# Patient Record
Sex: MALE
[De-identification: names, ages, dates, MRNs, and addresses within clinical notes are randomized per-mention and may not be internally consistent; named-entity substitution may affect disease eponyms.]

---

## 2017-10-21 ENCOUNTER — CHARTING TRANS (OUTPATIENT)
Dept: OTHER | Age: 62
End: 2017-10-21

## 2018-11-23 ENCOUNTER — IMAGING SERVICES (OUTPATIENT)
Dept: OTHER | Age: 63
End: 2018-11-23

## 2020-06-23 ENCOUNTER — HOSPITAL ENCOUNTER (OUTPATIENT)
Age: 65
Discharge: HOME | End: 2020-06-23
Payer: MEDICARE

## 2020-06-23 DIAGNOSIS — I25.10: ICD-10-CM

## 2020-06-23 DIAGNOSIS — Z01.818: Primary | ICD-10-CM

## 2020-06-23 PROCEDURE — A9502 TC99M TETROFOSMIN: HCPCS

## 2020-06-23 PROCEDURE — 93017 CV STRESS TEST TRACING ONLY: CPT

## 2020-06-23 PROCEDURE — 78452 HT MUSCLE IMAGE SPECT MULT: CPT

## 2020-06-24 ENCOUNTER — HOSPITAL ENCOUNTER (OUTPATIENT)
Dept: HOSPITAL 102 - ANHCOVIDDT | Age: 65
Discharge: HOME | End: 2020-06-24
Payer: MEDICARE

## 2020-06-24 DIAGNOSIS — Z11.59: ICD-10-CM

## 2020-06-24 DIAGNOSIS — Z01.812: Primary | ICD-10-CM

## 2020-06-24 PROCEDURE — 87635 SARS-COV-2 COVID-19 AMP PRB: CPT

## 2020-06-24 PROCEDURE — U0003 INFECTIOUS AGENT DETECTION BY NUCLEIC ACID (DNA OR RNA); SEVERE ACUTE RESPIRATORY SYNDROME CORONAVIRUS 2 (SARS-COV-2) (CORONAVIRUS DISEASE [COVID-19]), AMPLIFIED PROBE TECHNIQUE, MAKING USE OF HIGH THROUGHPUT TECHNOLOGIES AS DESCRIBED BY CMS-2020-01-R: HCPCS

## 2020-06-24 PROCEDURE — C9803 HOPD COVID-19 SPEC COLLECT: HCPCS

## 2020-06-26 ENCOUNTER — HOSPITAL ENCOUNTER (OUTPATIENT)
Age: 65
Discharge: HOME | End: 2020-06-26
Payer: MEDICARE

## 2020-06-26 VITALS
HEART RATE: 48 BPM | SYSTOLIC BLOOD PRESSURE: 147 MMHG | RESPIRATION RATE: 14 BRPM | OXYGEN SATURATION: 98 % | DIASTOLIC BLOOD PRESSURE: 66 MMHG

## 2020-06-26 VITALS
HEART RATE: 49 BPM | RESPIRATION RATE: 14 BRPM | OXYGEN SATURATION: 95 % | DIASTOLIC BLOOD PRESSURE: 71 MMHG | SYSTOLIC BLOOD PRESSURE: 135 MMHG

## 2020-06-26 VITALS
RESPIRATION RATE: 18 BRPM | SYSTOLIC BLOOD PRESSURE: 144 MMHG | DIASTOLIC BLOOD PRESSURE: 73 MMHG | OXYGEN SATURATION: 99 % | HEART RATE: 54 BPM | TEMPERATURE: 97.52 F

## 2020-06-26 VITALS
SYSTOLIC BLOOD PRESSURE: 142 MMHG | DIASTOLIC BLOOD PRESSURE: 72 MMHG | OXYGEN SATURATION: 98 % | RESPIRATION RATE: 14 BRPM | HEART RATE: 47 BPM

## 2020-06-26 VITALS — DIASTOLIC BLOOD PRESSURE: 59 MMHG | RESPIRATION RATE: 14 BRPM | HEART RATE: 43 BPM | SYSTOLIC BLOOD PRESSURE: 133 MMHG

## 2020-06-26 VITALS
DIASTOLIC BLOOD PRESSURE: 61 MMHG | OXYGEN SATURATION: 95 % | SYSTOLIC BLOOD PRESSURE: 133 MMHG | HEART RATE: 49 BPM | RESPIRATION RATE: 14 BRPM

## 2020-06-26 VITALS
RESPIRATION RATE: 12 BRPM | TEMPERATURE: 97.2 F | SYSTOLIC BLOOD PRESSURE: 155 MMHG | OXYGEN SATURATION: 98 % | HEART RATE: 54 BPM | DIASTOLIC BLOOD PRESSURE: 71 MMHG

## 2020-06-26 VITALS — RESPIRATION RATE: 14 BRPM | SYSTOLIC BLOOD PRESSURE: 136 MMHG | DIASTOLIC BLOOD PRESSURE: 64 MMHG | HEART RATE: 47 BPM

## 2020-06-26 VITALS — HEART RATE: 45 BPM | RESPIRATION RATE: 14 BRPM | SYSTOLIC BLOOD PRESSURE: 135 MMHG | DIASTOLIC BLOOD PRESSURE: 61 MMHG

## 2020-06-26 VITALS
OXYGEN SATURATION: 95 % | HEART RATE: 42 BPM | DIASTOLIC BLOOD PRESSURE: 63 MMHG | RESPIRATION RATE: 14 BRPM | SYSTOLIC BLOOD PRESSURE: 139 MMHG

## 2020-06-26 VITALS
RESPIRATION RATE: 14 BRPM | HEART RATE: 42 BPM | DIASTOLIC BLOOD PRESSURE: 66 MMHG | OXYGEN SATURATION: 95 % | SYSTOLIC BLOOD PRESSURE: 131 MMHG

## 2020-06-26 VITALS — BODY MASS INDEX: 32.3 KG/M2

## 2020-06-26 DIAGNOSIS — Z79.899: ICD-10-CM

## 2020-06-26 DIAGNOSIS — S46.011A: ICD-10-CM

## 2020-06-26 DIAGNOSIS — E03.9: ICD-10-CM
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
## 2020-06-26 DIAGNOSIS — G89.18: ICD-10-CM

## 2020-06-26 DIAGNOSIS — E66.9: ICD-10-CM

## 2020-06-26 DIAGNOSIS — S46.211A: Primary | ICD-10-CM

## 2020-06-26 DIAGNOSIS — Z95.5: ICD-10-CM

## 2020-06-26 DIAGNOSIS — Z79.82: ICD-10-CM

## 2020-06-26 DIAGNOSIS — M24.111: ICD-10-CM

## 2020-06-26 DIAGNOSIS — E78.2: ICD-10-CM

## 2020-06-26 DIAGNOSIS — I25.10: ICD-10-CM

## 2020-06-26 DIAGNOSIS — I10: ICD-10-CM

## 2020-06-26 DIAGNOSIS — G47.33: ICD-10-CM

## 2020-06-26 DIAGNOSIS — M67.813: ICD-10-CM

## 2020-06-26 DIAGNOSIS — S43.431A: ICD-10-CM

## 2020-06-26 PROCEDURE — 64415 NJX AA&/STRD BRCH PLXS IMG: CPT

## 2020-06-26 PROCEDURE — 29827 SHO ARTHRS SRG RT8TR CUF RPR: CPT

## 2020-06-26 PROCEDURE — 24342 REPAIR OF RUPTURED TENDON: CPT

## 2020-06-26 PROCEDURE — A9270 NON-COVERED ITEM OR SERVICE: HCPCS

## 2022-08-22 ENCOUNTER — HOSPITAL ENCOUNTER (OUTPATIENT)
Age: 67
Discharge: HOME | End: 2022-08-22
Payer: MEDICARE

## 2022-08-22 DIAGNOSIS — R79.89: ICD-10-CM

## 2022-08-22 DIAGNOSIS — N17.8: Primary | ICD-10-CM

## 2022-08-22 PROCEDURE — 76775 US EXAM ABDO BACK WALL LIM: CPT

## 2023-06-13 ENCOUNTER — HOSPITAL ENCOUNTER (OUTPATIENT)
Dept: HOSPITAL 102 - ANHIMG | Age: 68
Discharge: HOME | End: 2023-06-13
Payer: MEDICARE

## 2023-06-13 DIAGNOSIS — R09.89: Primary | ICD-10-CM

## 2023-06-13 DIAGNOSIS — I65.23: ICD-10-CM

## 2023-06-13 PROCEDURE — 93880 EXTRACRANIAL BILAT STUDY: CPT

## 2023-08-24 ENCOUNTER — HOSPITAL ENCOUNTER (OUTPATIENT)
Age: 68
End: 2023-08-24
Payer: MEDICARE

## 2023-08-24 DIAGNOSIS — M17.11: ICD-10-CM

## 2023-08-24 DIAGNOSIS — M25.461: ICD-10-CM

## 2023-08-24 DIAGNOSIS — T14.90XA: ICD-10-CM

## 2023-08-24 DIAGNOSIS — M71.21: ICD-10-CM

## 2023-08-24 DIAGNOSIS — S83.231A: ICD-10-CM

## 2023-08-24 DIAGNOSIS — M22.41: ICD-10-CM

## 2023-08-24 DIAGNOSIS — M23.91: Primary | ICD-10-CM

## 2023-08-24 PROCEDURE — 73721 MRI JNT OF LWR EXTRE W/O DYE: CPT

## 2023-09-25 ENCOUNTER — HOSPITAL ENCOUNTER (OUTPATIENT)
Dept: HOSPITAL 102 - ANHSURGERY | Age: 68
Discharge: HOME | End: 2023-09-25
Payer: MEDICARE

## 2023-09-25 DIAGNOSIS — I12.9: Primary | ICD-10-CM

## 2023-09-25 DIAGNOSIS — N18.31: ICD-10-CM

## 2023-09-25 LAB
DELTA APTT PPP: 26.3 SECONDS (ref 22.3–36.8)
INR: 1
PARTIAL THROMBOPLASTIN TIME: 26.3 SECONDS (ref 22.3–36.8)
PROTHROMBIN TIME: 13.2 SECONDS (ref 11.1–14.7)

## 2023-09-25 PROCEDURE — 85730 THROMBOPLASTIN TIME PARTIAL: CPT

## 2023-09-25 PROCEDURE — 93005 ELECTROCARDIOGRAM TRACING: CPT

## 2023-09-25 PROCEDURE — 36415 COLL VENOUS BLD VENIPUNCTURE: CPT

## 2023-09-25 PROCEDURE — 85610 PROTHROMBIN TIME: CPT

## 2023-09-28 ENCOUNTER — HOSPITAL ENCOUNTER (OUTPATIENT)
Age: 68
Discharge: HOME | End: 2023-09-28
Payer: MEDICARE

## 2023-09-28 VITALS
TEMPERATURE: 97.7 F | RESPIRATION RATE: 16 BRPM | HEART RATE: 74 BPM | OXYGEN SATURATION: 99 % | SYSTOLIC BLOOD PRESSURE: 138 MMHG | DIASTOLIC BLOOD PRESSURE: 69 MMHG

## 2023-09-28 VITALS
HEART RATE: 55 BPM | DIASTOLIC BLOOD PRESSURE: 68 MMHG | SYSTOLIC BLOOD PRESSURE: 139 MMHG | TEMPERATURE: 97.52 F | OXYGEN SATURATION: 97 % | RESPIRATION RATE: 16 BRPM

## 2023-09-28 VITALS
SYSTOLIC BLOOD PRESSURE: 139 MMHG | DIASTOLIC BLOOD PRESSURE: 69 MMHG | OXYGEN SATURATION: 100 % | RESPIRATION RATE: 16 BRPM | HEART RATE: 62 BPM

## 2023-09-28 VITALS — DIASTOLIC BLOOD PRESSURE: 50 MMHG | HEART RATE: 56 BPM | SYSTOLIC BLOOD PRESSURE: 130 MMHG | RESPIRATION RATE: 18 BRPM

## 2023-09-28 VITALS
SYSTOLIC BLOOD PRESSURE: 140 MMHG | OXYGEN SATURATION: 96 % | DIASTOLIC BLOOD PRESSURE: 62 MMHG | RESPIRATION RATE: 16 BRPM | HEART RATE: 62 BPM

## 2023-09-28 VITALS — SYSTOLIC BLOOD PRESSURE: 139 MMHG | RESPIRATION RATE: 18 BRPM | DIASTOLIC BLOOD PRESSURE: 58 MMHG | HEART RATE: 57 BPM

## 2023-09-28 VITALS — DIASTOLIC BLOOD PRESSURE: 54 MMHG | RESPIRATION RATE: 16 BRPM | HEART RATE: 53 BPM | SYSTOLIC BLOOD PRESSURE: 144 MMHG

## 2023-09-28 VITALS — BODY MASS INDEX: 34.6 KG/M2

## 2023-09-28 DIAGNOSIS — E55.9: ICD-10-CM

## 2023-09-28 DIAGNOSIS — E66.9: ICD-10-CM

## 2023-09-28 DIAGNOSIS — Z87.891: ICD-10-CM

## 2023-09-28 DIAGNOSIS — Z95.5: ICD-10-CM

## 2023-09-28 DIAGNOSIS — I25.10: ICD-10-CM

## 2023-09-28 DIAGNOSIS — E78.2: ICD-10-CM

## 2023-09-28 DIAGNOSIS — M23.331: Primary | ICD-10-CM

## 2023-09-28 DIAGNOSIS — M22.41: ICD-10-CM

## 2023-09-28 DIAGNOSIS — Z79.82: ICD-10-CM

## 2023-09-28 DIAGNOSIS — G47.33: ICD-10-CM

## 2023-09-28 DIAGNOSIS — E03.9: ICD-10-CM

## 2023-09-28 PROCEDURE — 29881 ARTHRS KNE SRG MNISECTMY M/L: CPT

## 2023-09-28 PROCEDURE — A9270 NON-COVERED ITEM OR SERVICE: HCPCS

## 2024-09-15 ENCOUNTER — HOSPITAL ENCOUNTER (EMERGENCY)
Age: 69
Discharge: HOME | End: 2024-09-15
Payer: MEDICARE

## 2024-09-15 VITALS
TEMPERATURE: 97.8 F | DIASTOLIC BLOOD PRESSURE: 62 MMHG | RESPIRATION RATE: 18 BRPM | HEART RATE: 58 BPM | OXYGEN SATURATION: 98 % | SYSTOLIC BLOOD PRESSURE: 133 MMHG

## 2024-09-15 VITALS
SYSTOLIC BLOOD PRESSURE: 133 MMHG | HEART RATE: 58 BPM | DIASTOLIC BLOOD PRESSURE: 62 MMHG | TEMPERATURE: 97.88 F | OXYGEN SATURATION: 98 % | RESPIRATION RATE: 18 BRPM

## 2024-09-15 DIAGNOSIS — Z20.822: ICD-10-CM

## 2024-09-15 DIAGNOSIS — Z87.891: ICD-10-CM

## 2024-09-15 DIAGNOSIS — E03.9: ICD-10-CM

## 2024-09-15 DIAGNOSIS — I25.10: ICD-10-CM

## 2024-09-15 DIAGNOSIS — B34.9: Primary | ICD-10-CM

## 2024-09-15 DIAGNOSIS — E55.9: ICD-10-CM

## 2024-09-15 DIAGNOSIS — E78.2: ICD-10-CM

## 2024-09-15 DIAGNOSIS — Z95.5: ICD-10-CM

## 2024-09-15 PROCEDURE — 71046 X-RAY EXAM CHEST 2 VIEWS: CPT

## 2024-09-15 PROCEDURE — G0463 HOSPITAL OUTPT CLINIC VISIT: HCPCS

## 2024-09-15 PROCEDURE — 87635 SARS-COV-2 COVID-19 AMP PRB: CPT

## 2024-09-15 PROCEDURE — 99213 OFFICE O/P EST LOW 20 MIN: CPT

## 2024-09-15 PROCEDURE — 87804 INFLUENZA ASSAY W/OPTIC: CPT

## 2024-09-15 NOTE — ED.URI
HPI - URI/Sore Throat
General
Chief Complaint: Upper Respiratory Infection
Stated Complaint: cold symthoms
Source: patient
Mode of arrival: ambulatory
Limitations: no limitations
History of Present Illness
HPI Narrative: 
69-year-old male presents to [facility] with complaints of 2 day history of nonproductive cough, sinus pressure, nasal congestion, runny nose and body aches.  Patient has been taking over-the-counter cold medications and multivitamins with little 
relief.  Patient denies sick contacts.  Patient reports that he did recently travel to [place] last week.  Patient is a nonsmoker.
MD elicited complaint: cough, rhinorrhea and nasal congestion
Onset (ago): day(s) (2)
Severity: mild
Able to tolerate fluids by mouth: Yes
Treatments prior to arrival:  cold medicine 
Related Data
Home Medications

 Medication  Instructions  Recorded  Confirmed
aspirin 81 mg tablet,delayed 81 mg PO DAILY 04/27/20 09/15/24
release (Adult Low Dose Aspirin)   
multivitamin 1 tablet PO DAILY 04/27/20 09/15/24
nebivolol 2.5 mg tablet (Bystolic) 2.5 mg PO QAM 09/21/23 09/15/24
nitroglycerin 0.4 mg sublingual 0.4 mg sublingual Q5-15M PRN Chest 09/21/23 09/15/24
tablet Pain  
sildenafil 25 mg tablet (Viagra) 25 mg PO DAILY PRN Erectile 09/21/23 09/15/24
 Dysfunction  
magnesium 200 mg tablet 200 mg PO DAILY 08/06/24 09/15/24


Allergies

Allergy/AdvReac Type Severity Reaction Status Date / Time
No Known Allergies Allergy   Verified 09/15/24 12:56



Review of Systems
Constitutional:   Constitutional: Denies chills, Denies fatigue, Denies fever(s) and Denies weakness  Comments: 

Body aches
 
ENT:   Denies dizziness, Denies epistaxis, Reports nasal congestion and Denies sore throat
Cardiovascular:   Cardiovascular: Denies chest pain
Respiratory:   Respiratory: Denies chest congestion, Reports cough, Denies dyspnea and Denies wheezing
Gastrointestinal:   Gastrointestinal: Denies diarrhea, Denies nausea and Denies vomiting
Integumentary/Breasts:   Skin/Breast: Denies erythema and Denies rash
Neurologic:   Denies dizziness, Denies syncope and Denies headache(s)

Formerly [facility]
Past Medical History
Medical History (Reviewed 09/15/24 @ 13:07 by Elvira Downey, APRN)

23-polyvalent pneumococcal polysaccharide vaccine contraindicated as received shingles vaccine within last 4 weeks
Abnormal blood chemistry
Arthritis of subtalar joint
CAD S/P percutaneous coronary angioplasty
Coronary artery disease involving native coronary artery of native heart
Eczema
Encounter for special screening examination for neoplasm of prostate
Hyperglycemia
Hypothyroidism, unspecified
Mixed hyperlipidemia
MADI (obstructive sleep apnea)
Partial tear of right subscapularis tendon
Rotator cuff arthropathy of right shoulder
Rupture of right long head biceps tendon
Screening PSA (prostate specific antigen)
Seasonal allergic rhinitis
Vitamin D deficiency, unspecified


Surgical History
Surgical History (Reviewed 09/15/24 @ 13:07 by ALEXI Hale)

History of ankle surgery (~2018)
History of bunionectomy (~1995)
History of heart artery stent (~2016)
History of meniscectomy of right knee (~09/28/23)
 medial
Status post labral repair of shoulder (~2010)


Family History
Family History (Reviewed 09/15/24 @ 13:07 by ALEXI Hale)
Grandparent
 Malignant neoplasm of prostate
Father
 Family history of malignant neoplasm of kidney
Unknown
 Heart disease
Other
 Family history of malignant neoplasm



Social History
Social History (Reviewed 09/15/24 @ 13:07 by ALEXI Hale)
Smoking packs per day:  1 
Smoking cigarettes per day:  20.0 
Years smoked:  20 
Smoking pack-years:  20.00 
Smoking status:  Former smoker 
Tobacco type:  cigarettes 
Second hand tobacco smoke exposure:  No 
Smoking end date:  07/01/90 
Alcohol intake:  current 
Drinks per week:  3 
Substance use:  never 
Substance use type:  does not use 
Do You Feel Safe in your Home?:  Yes 
Lack of Transportati

## 2024-09-15 NOTE — XR_ITS
EXAMINATION: XR chest 2V 
 
DATE: 09/15/2024 13:12 
 
INDICATION: Cough 
 
TECHNIQUE: PA and lateral views of the chest were obtained. 
 
COMPARISON: Chest radiograph and CT dated 10/23/2017 
 
FINDINGS:  
The lungs are clear with no focal airspace opacities, pulmonary edema, pleural effusion or pneumothor
ax. The cardiomediastinal silhouette is normal. Couple old healed posterior left third-sixth rib frac
tures. 
 
IMPRESSION: 
1. No acute cardiopulmonary disease. 
 
__________________________________________________ 
Reviewed, dictated and finalized at location A. 
Electronically signed by Dileep Leigh M.D. on 9/15/2024 13:20 CDT 
IMPRESSION:  
1. No acute cardiopulmonary disease.

## 2024-09-15 NOTE — ED_ITS
HPI - URI/Sore Throat    
General    
Chief Complaint: Upper Respiratory Infection    
Stated Complaint: cold symthoms    
Source: patient    
Mode of arrival: ambulatory    
Limitations: no limitations    
History of Present Illness    
HPI Narrative:     
69-year-old male presents to [facility] with complaints of 2 day history of   
nonproductive cough, sinus pressure, nasal congestion, runny nose and body   
aches.  Patient has been taking over-the-counter cold medications and   
multivitamins with little relief.  Patient denies sick contacts.  Patient   
reports that he did recently travel to [place] last week.  Patient is a   
nonsmoker.    
MD elicited complaint: cough, rhinorrhea and nasal congestion    
Onset (ago): day(s) (2)    
Severity: mild    
Able to tolerate fluids by mouth: Yes    
Treatments prior to arrival:  cold medicine     
Related Data    
                                Home Medications    
    
    
    
 Medication  Instructions  Recorded  Confirmed    
     
aspirin 81 mg tablet,delayed 81 mg PO DAILY 04/27/20 09/15/24    
    
release (Adult Low Dose Aspirin)       
     
multivitamin 1 tablet PO DAILY 04/27/20 09/15/24    
     
nebivolol 2.5 mg tablet (Bystolic) 2.5 mg PO QAM 09/21/23 09/15/24    
     
nitroglycerin 0.4 mg sublingual 0.4 mg sublingual Q5-15M PRN Chest 09/21/23   
09/15/24    
    
tablet Pain      
     
sildenafil 25 mg tablet (Viagra) 25 mg PO DAILY PRN Erectile 09/21/23 09/15/24    
    
 Dysfunction      
     
magnesium 200 mg tablet 200 mg PO DAILY 08/06/24 09/15/24    
    
    
    
                                    Allergies    
    
    
    
Allergy/AdvReac Type Severity Reaction Status Date / Time    
     
No Known Allergies Allergy   Verified 09/15/24 12:56    
    
    
    
    
Review of Systems    
    
    
Constitutional:   Constitutional: Denies chills, Denies fatigue, Denies fever(s)  
and Denies weakness    Comments:     
    
Body aches    
       
     
ENT:   Denies dizziness, Denies epistaxis, Reports nasal congestion and Denies   
sore throat      
     
Cardiovascular:   Cardiovascular: Denies chest pain      
     
Respiratory:   Respiratory: Denies chest congestion, Reports cough, Denies   
dyspnea and Denies wheezing      
     
Gastrointestinal:   Gastrointestinal: Denies diarrhea, Denies nausea and Denies   
vomiting      
     
Integumentary/Breasts:   Skin/Breast: Denies erythema and Denies rash      
     
Neurologic:   Denies dizziness, Denies syncope and Denies headache(s)      
    
    
[facility]    
Past Medical History    
Medical History (Reviewed 09/15/24 @ 13:07 by Elvira Downey, APRN)    
    
23-polyvalent pneumococcal polysaccharide vaccine contraindicated as received   
shingles vaccine within last 4 weeks    
Abnormal blood chemistry    
Arthritis of subtalar joint    
CAD S/P percutaneous coronary angioplasty    
Coronary artery disease involving native coronary artery of native heart    
Eczema    
Encounter for special screening examination for neoplasm of prostate    
Hyperglycemia    
Hypothyroidism, unspecified    
Mixed hyperlipidemia    
MADI (obstructive sleep apnea)    
Partial tear of right subscapularis tendon    
Rotator cuff arthropathy of right shoulder    
Rupture of right long head biceps tendon    
Screening PSA (prostate specific antigen)    
Seasonal allergic rhinitis    
Vitamin D deficiency, unspecified    
    
    
Surgical History    
Surgical History (Reviewed 09/15/24 @ 13:07 by Elvira Downey, APRN)    
    
History of ankle surgery (~2018)    
History of bunionectomy (~1995)    
History of heart artery stent (~2016)    
History of meniscectomy of right knee (~09/28/23)    
   medial    
Status post labral repair of shoulder (~2010)    
    
    
Family History    
Family History (Reviewed 09/15/24 @ 13:07 by ALEXI Hale)    
Grandparent   Malignant neoplasm of prostate